# Patient Record
Sex: MALE | URBAN - METROPOLITAN AREA
[De-identification: names, ages, dates, MRNs, and addresses within clinical notes are randomized per-mention and may not be internally consistent; named-entity substitution may affect disease eponyms.]

---

## 2023-01-06 ENCOUNTER — TELEPHONE (OUTPATIENT)
Dept: ENDOCRINOLOGY | Age: 68
End: 2023-01-06

## 2023-01-19 ENCOUNTER — TELEPHONE (OUTPATIENT)
Dept: ENDOCRINOLOGY | Age: 68
End: 2023-01-19

## 2023-01-19 DIAGNOSIS — M89.9 DISEASE OF SKELETAL SYSTEM: Primary | ICD-10-CM

## 2023-01-19 DIAGNOSIS — M85.89 OSTEOPENIA OF MULTIPLE SITES: ICD-10-CM

## 2023-01-20 NOTE — TELEPHONE ENCOUNTER
Patient is scheduled for a NP appointment with Dr. Cele Kaplan on 3/23/23 @ 1:00 pm.        Patient has been advised to bring any vitamins and/or supplements to the appointment.

## 2023-03-23 ENCOUNTER — OFFICE VISIT (OUTPATIENT)
Dept: ENDOCRINOLOGY | Age: 68
End: 2023-03-23
Payer: COMMERCIAL

## 2023-03-23 VITALS — HEIGHT: 65 IN | SYSTOLIC BLOOD PRESSURE: 132 MMHG | DIASTOLIC BLOOD PRESSURE: 78 MMHG

## 2023-03-23 DIAGNOSIS — M89.9 DISEASE OF SKELETAL SYSTEM: Primary | ICD-10-CM

## 2023-03-23 DIAGNOSIS — M85.89 OSTEOPENIA OF MULTIPLE SITES: ICD-10-CM

## 2023-03-23 PROCEDURE — 1123F ACP DISCUSS/DSCN MKR DOCD: CPT | Performed by: INTERNAL MEDICINE

## 2023-03-23 PROCEDURE — 99205 OFFICE O/P NEW HI 60 MIN: CPT | Performed by: INTERNAL MEDICINE

## 2023-03-23 RX ORDER — PHENOL 1.4 %
1 AEROSOL, SPRAY (ML) MUCOUS MEMBRANE DAILY
COMMUNITY

## 2023-03-23 RX ORDER — CYCLOBENZAPRINE HCL 10 MG
10 TABLET ORAL 2 TIMES DAILY PRN
COMMUNITY
Start: 2022-01-01

## 2023-03-23 RX ORDER — ALENDRONATE SODIUM 70 MG/1
70 TABLET ORAL WEEKLY
Qty: 12 TABLET | Refills: 4 | Status: SHIPPED | OUTPATIENT
Start: 2023-03-23

## 2023-03-23 RX ORDER — COVID-19 ANTIGEN TEST
220 KIT MISCELLANEOUS 3 TIMES DAILY
COMMUNITY
Start: 2016-06-01

## 2023-03-23 RX ORDER — IBUPROFEN 200 MG
200 TABLET ORAL EVERY 6 HOURS PRN
COMMUNITY

## 2023-03-23 RX ORDER — TADALAFIL 10 MG/1
10 TABLET ORAL DAILY PRN
COMMUNITY
Start: 2022-06-30

## 2023-03-23 RX ORDER — GABAPENTIN 800 MG/1
800 TABLET ORAL 3 TIMES DAILY
COMMUNITY
Start: 2023-02-19 | End: 2023-05-21

## 2023-03-23 RX ORDER — M-VIT,TX,IRON,MINS/CALC/FOLIC 27MG-0.4MG
1 TABLET ORAL DAILY
COMMUNITY

## 2023-03-23 RX ORDER — ATORVASTATIN CALCIUM 20 MG/1
1 TABLET, FILM COATED ORAL DAILY
COMMUNITY
Start: 2021-06-01

## 2023-03-23 RX ORDER — ACETAMINOPHEN 160 MG
TABLET,DISINTEGRATING ORAL
COMMUNITY

## 2023-03-23 NOTE — PROGRESS NOTES
fusion as planned. Bone density is the hip is in the osteopenia range. Using FRAX, the 10-year fracture risk is low and should remain below the recommended intervention thresholds for the foreseeable future. Pharmacologic therapy to reduce fracture risk is not needed at this time. However, antiresorptive therapy could improve bone strength and improve surgical outcomes. THERAPEUTIC PLANS. Calcium, target 1200 mg daily; we discussed recommended calcium intake and the effects of excessive calcium intake from supplements. I provided a handout with information about how to calculate daily calcium intake. Advised to stop calcium supplements. Vitamin D, current vitamin D intake should be OK. Exercise, Recommended weight-bearing exercise (walking or equivalent) 30-40 minutes per session, 3 or 4 sessions a week. He is limited now by his back problem  Pharmacologic therapy, I recommended alendronate 70 mg weekly and he agreed. Rx sent, dosing instructions reviewed. Return appointment with [facility] in 1 year. Total time on the date the encounter was 60-74 minutes. Michela Muhammad MD, Director, [facility]) Osteoporosis [facility]    CC: Angely Hart MD, fax [phone]  Evi Hoffman MD, fax [phone]  Que Black MD

## 2023-03-23 NOTE — LETTER
[address] [facility]  [facility] [address] [facility], [address] [place] [address],[place] [address]  Phone [phone]  Fax [phone]         2023         Chloe Levi MD, fax [phone]                              Re:  Bree Rangel,  1955    Dear Dr. Gigi Lerma: Thank you for asking me to see Bree Rangel in consultation. As you know, Mr. Mathew Salas is a 79 y.o. with major spine issues, scheduled for lumbar fusion in about 2 weeks. Bone density is spuriously high in the spine, -1.8 in the lowest his site (femoral neck). We reviewed life-style issues (calcium, vitamin D and physical activity). Using FRAX, his 10-year fracture risk is low and should remain below the recommended intervention thresholds for the foreseeable future, so pharmacologic therapy to reduce fracture risk is not needed at this time. However, antiresorptive therapy could improve bone strength and improve surgical outcomes. I recommended alendronate 70 mg weekly and he agreed. Rx sent, dosing instructions reviewed. I see no reason not to proceed with surgery as scheduled. Enclosed is a copy of my consultation note. Please let me know if you have any questions. Sincerely,    [email or web address]     Encl.  Copy of consult note      CC: Tk Melvin MD, fax [phone]  Mario Whittaker MD

## 2023-08-10 ENCOUNTER — OFFICE (OUTPATIENT)
Dept: URBAN - METROPOLITAN AREA CLINIC 18 | Facility: CLINIC | Age: 68
End: 2023-08-10
Payer: COMMERCIAL

## 2023-08-10 VITALS
SYSTOLIC BLOOD PRESSURE: 128 MMHG | WEIGHT: 165 LBS | DIASTOLIC BLOOD PRESSURE: 84 MMHG | HEART RATE: 82 BPM | HEIGHT: 64 IN

## 2023-08-10 DIAGNOSIS — Z80.0 FAMILY HISTORY OF MALIGNANT NEOPLASM OF DIGESTIVE ORGANS: ICD-10-CM

## 2023-08-10 DIAGNOSIS — Z86.010 PERSONAL HISTORY OF COLONIC POLYPS: ICD-10-CM

## 2023-08-10 DIAGNOSIS — Z12.11 ENCOUNTER FOR SCREENING FOR MALIGNANT NEOPLASM OF COLON: ICD-10-CM

## 2023-08-10 PROCEDURE — S0285 CNSLT BEFORE SCREEN COLONOSC: HCPCS | Performed by: INTERNAL MEDICINE

## 2023-09-28 ENCOUNTER — AMBULATORY SURGICAL CENTER (OUTPATIENT)
Dept: URBAN - METROPOLITAN AREA SURGERY 5 | Facility: SURGERY | Age: 68
End: 2023-09-28
Payer: COMMERCIAL

## 2023-09-28 ENCOUNTER — OFFICE (OUTPATIENT)
Dept: URBAN - METROPOLITAN AREA PATHOLOGY 1 | Facility: PATHOLOGY | Age: 68
End: 2023-09-28
Payer: COMMERCIAL

## 2023-09-28 VITALS
RESPIRATION RATE: 17 BRPM | OXYGEN SATURATION: 99 % | RESPIRATION RATE: 20 BRPM | SYSTOLIC BLOOD PRESSURE: 131 MMHG | SYSTOLIC BLOOD PRESSURE: 136 MMHG | DIASTOLIC BLOOD PRESSURE: 91 MMHG | SYSTOLIC BLOOD PRESSURE: 139 MMHG | RESPIRATION RATE: 12 BRPM | RESPIRATION RATE: 14 BRPM | HEART RATE: 99 BPM | HEART RATE: 94 BPM | DIASTOLIC BLOOD PRESSURE: 80 MMHG | DIASTOLIC BLOOD PRESSURE: 81 MMHG | OXYGEN SATURATION: 99 % | SYSTOLIC BLOOD PRESSURE: 131 MMHG | OXYGEN SATURATION: 96 % | HEART RATE: 104 BPM | DIASTOLIC BLOOD PRESSURE: 82 MMHG | DIASTOLIC BLOOD PRESSURE: 85 MMHG | SYSTOLIC BLOOD PRESSURE: 124 MMHG | RESPIRATION RATE: 12 BRPM | HEART RATE: 98 BPM | TEMPERATURE: 97.3 F | RESPIRATION RATE: 18 BRPM | DIASTOLIC BLOOD PRESSURE: 94 MMHG | RESPIRATION RATE: 18 BRPM | HEIGHT: 67 IN | DIASTOLIC BLOOD PRESSURE: 82 MMHG | OXYGEN SATURATION: 96 % | RESPIRATION RATE: 16 BRPM | RESPIRATION RATE: 21 BRPM | HEART RATE: 99 BPM | DIASTOLIC BLOOD PRESSURE: 85 MMHG | SYSTOLIC BLOOD PRESSURE: 126 MMHG | DIASTOLIC BLOOD PRESSURE: 80 MMHG | HEART RATE: 95 BPM | HEART RATE: 94 BPM | DIASTOLIC BLOOD PRESSURE: 84 MMHG | HEART RATE: 95 BPM | SYSTOLIC BLOOD PRESSURE: 145 MMHG | SYSTOLIC BLOOD PRESSURE: 138 MMHG | DIASTOLIC BLOOD PRESSURE: 100 MMHG | HEART RATE: 97 BPM | OXYGEN SATURATION: 98 % | DIASTOLIC BLOOD PRESSURE: 90 MMHG | OXYGEN SATURATION: 100 % | SYSTOLIC BLOOD PRESSURE: 130 MMHG | HEART RATE: 96 BPM | SYSTOLIC BLOOD PRESSURE: 124 MMHG | SYSTOLIC BLOOD PRESSURE: 136 MMHG | DIASTOLIC BLOOD PRESSURE: 100 MMHG | DIASTOLIC BLOOD PRESSURE: 90 MMHG | HEART RATE: 98 BPM | SYSTOLIC BLOOD PRESSURE: 130 MMHG | WEIGHT: 170 LBS | WEIGHT: 170 LBS | RESPIRATION RATE: 8 BRPM | SYSTOLIC BLOOD PRESSURE: 161 MMHG | RESPIRATION RATE: 14 BRPM | SYSTOLIC BLOOD PRESSURE: 156 MMHG | RESPIRATION RATE: 20 BRPM | SYSTOLIC BLOOD PRESSURE: 145 MMHG | TEMPERATURE: 97.3 F | HEART RATE: 87 BPM | SYSTOLIC BLOOD PRESSURE: 123 MMHG | SYSTOLIC BLOOD PRESSURE: 156 MMHG | HEART RATE: 97 BPM | RESPIRATION RATE: 8 BRPM | SYSTOLIC BLOOD PRESSURE: 161 MMHG | SYSTOLIC BLOOD PRESSURE: 139 MMHG | DIASTOLIC BLOOD PRESSURE: 84 MMHG | SYSTOLIC BLOOD PRESSURE: 126 MMHG | RESPIRATION RATE: 17 BRPM | OXYGEN SATURATION: 98 % | SYSTOLIC BLOOD PRESSURE: 138 MMHG | RESPIRATION RATE: 21 BRPM | DIASTOLIC BLOOD PRESSURE: 81 MMHG | OXYGEN SATURATION: 100 % | SYSTOLIC BLOOD PRESSURE: 123 MMHG | HEIGHT: 67 IN | HEART RATE: 87 BPM | HEART RATE: 104 BPM | RESPIRATION RATE: 16 BRPM | DIASTOLIC BLOOD PRESSURE: 91 MMHG | HEART RATE: 96 BPM | DIASTOLIC BLOOD PRESSURE: 94 MMHG

## 2023-09-28 DIAGNOSIS — K63.5 POLYP OF COLON: ICD-10-CM

## 2023-09-28 DIAGNOSIS — K57.30 DIVERTICULOSIS OF LARGE INTESTINE WITHOUT PERFORATION OR ABS: ICD-10-CM

## 2023-09-28 DIAGNOSIS — Z86.010 PERSONAL HISTORY OF COLONIC POLYPS: ICD-10-CM

## 2023-09-28 DIAGNOSIS — Z80.0 FAMILY HISTORY OF MALIGNANT NEOPLASM OF DIGESTIVE ORGANS: ICD-10-CM

## 2023-09-28 DIAGNOSIS — Z09 ENCOUNTER FOR FOLLOW-UP EXAMINATION AFTER COMPLETED TREATMEN: ICD-10-CM

## 2023-09-28 PROCEDURE — 45385 COLONOSCOPY W/LESION REMOVAL: CPT | Mod: 33 | Performed by: INTERNAL MEDICINE

## 2023-09-28 PROCEDURE — 88305 TISSUE EXAM BY PATHOLOGIST: CPT | Performed by: PATHOLOGY

## 2023-10-03 LAB
PDF: PDF REPORT: (no result)
PDF: PDF REPORT: (no result)

## 2024-04-14 PROBLEM — M85.80 OSTEOPENIA WITH HIGH RISK OF FRACTURE: Status: ACTIVE | Noted: 2023-01-19

## 2024-05-14 ENCOUNTER — OFFICE VISIT (OUTPATIENT)
Dept: ENDOCRINOLOGY | Age: 69
End: 2024-05-14
Payer: MEDICARE

## 2024-05-14 ENCOUNTER — HOSPITAL ENCOUNTER (OUTPATIENT)
Dept: GENERAL RADIOLOGY | Age: 69
Discharge: HOME | End: 2024-05-14
Payer: MEDICARE

## 2024-05-14 DIAGNOSIS — M89.9 DISEASE OF SKELETAL SYSTEM: ICD-10-CM

## 2024-05-14 DIAGNOSIS — M85.80 OSTEOPENIA WITH HIGH RISK OF FRACTURE: ICD-10-CM

## 2024-05-14 DIAGNOSIS — M89.9 DISEASE OF SKELETAL SYSTEM: Primary | ICD-10-CM

## 2024-05-14 PROCEDURE — 77080 DXA BONE DENSITY AXIAL: CPT | Performed by: INTERNAL MEDICINE

## 2024-05-14 PROCEDURE — 1123F ACP DISCUSS/DSCN MKR DOCD: CPT | Performed by: INTERNAL MEDICINE

## 2024-05-14 PROCEDURE — 1036F TOBACCO NON-USER: CPT | Performed by: INTERNAL MEDICINE

## 2024-05-14 PROCEDURE — G2211 COMPLEX E/M VISIT ADD ON: HCPCS | Performed by: INTERNAL MEDICINE

## 2024-05-14 PROCEDURE — G8421 BMI NOT CALCULATED: HCPCS | Performed by: INTERNAL MEDICINE

## 2024-05-14 PROCEDURE — G8427 DOCREV CUR MEDS BY ELIG CLIN: HCPCS | Performed by: INTERNAL MEDICINE

## 2024-05-14 PROCEDURE — 99214 OFFICE O/P EST MOD 30 MIN: CPT | Performed by: INTERNAL MEDICINE

## 2024-05-14 PROCEDURE — 77080 DXA BONE DENSITY AXIAL: CPT

## 2024-05-14 PROCEDURE — 3017F COLORECTAL CA SCREEN DOC REV: CPT | Performed by: INTERNAL MEDICINE

## 2024-05-14 RX ORDER — MELOXICAM 15 MG/1
TABLET ORAL
COMMUNITY

## 2024-05-14 RX ORDER — ALENDRONATE SODIUM 70 MG/1
70 TABLET ORAL WEEKLY
Qty: 12 TABLET | Refills: 4 | Status: SHIPPED | OUTPATIENT
Start: 2024-05-14

## 2024-05-14 NOTE — PROGRESS NOTES
[facility]  [address] [place] [address] [address] [place] [address]  Phone [phone]  Fax [phone]    NAME:  IVIS HERR  :  1955  CONSULT DATE:    2023  MOST RECENT VISIT:  2023  TODAY'S DATE:  2024    Labs @ [place] 2023    PROBLEMS.  Low bone density by DXA 10/2022, lowest T-score -1.8 in the left femoral neck    Family history of osteoporosis, none  Prostate cancer, surgery 2014  Balance problems, frequent falls  Spinal stenosis, scoliosis - T7-S1 fusion 2023, redo 2023, broken maría and screw 10/2023    CURRENT MANAGEMENT FOR BONE HEALTH/OSTEOPOROSIS.  Calcium, 300 mg from low calcium foods, 300 mg milk, 300 mg yogurt, 300 mg cheese   diet MVI Ca+D other    Calcium 1200 210   mg/d   Vitamin D  1000  2000 IU/d   Exercise, limited by back problem.  Pharmacologic therapy: alendronate 70 mg weekly started 2023    PREVIOUS BONE-ACTIVE MEDICATIONS. none    OTHER CURRENT MEDICATIONS (SELECTED): tadalafil, gabapentin, Flexeril, atorvastatin  OTC MEDICATIONS (SELECTED): none    CHIEF COMPLAINT.  Here for f/u of low bone density, possible spine surgery, monitoring treatment.    INTERVAL HISTORY: See problem list for chronic/inactive conditions.  He has been taking alendronate correctly and without side effects. No falls, near-falls or fractures.  In a brace, still recovering from spine surgery.     FOR FULL DETAILS OF FAMILY HISTORY, PAST MEDICAL AND SURGICAL HISTORY, SOCIAL HISTORY, AND REVIEW OF SYSTEMS, SEE PATIENT QUESTIONNAIRE OF TODAY'S DATE.    PHYSICAL EXAMINATION.   GENERAL.  Well-nourished, well-developed, normally proportioned adult.   MENTAL STATUS. Pleasant mood.  Oriented to time, place, and person.  ORAL. Teeth appear to be in good condition.  MUSCULOSKELETAL.  Flattened thoracic spine. Marked lumbar scoliosis.  No spine tenderness to palpation or percussion.   No finger spaces between ribs and pelvis.  Gait

## 2024-06-13 DIAGNOSIS — M85.80 OSTEOPENIA WITH HIGH RISK OF FRACTURE: Primary | ICD-10-CM

## 2024-06-13 RX ORDER — ALENDRONATE SODIUM 70 MG/1
70 TABLET ORAL WEEKLY
Qty: 12 TABLET | Refills: 4 | Status: SHIPPED | OUTPATIENT
Start: 2024-06-13

## 2024-06-24 DIAGNOSIS — M85.80 OSTEOPENIA WITH HIGH RISK OF FRACTURE: ICD-10-CM

## 2024-06-24 NOTE — TELEPHONE ENCOUNTER
LVM to ask if patient wants refills for alendronate faxed to [facility] or [facility] on [facility]?

## 2024-06-25 RX ORDER — ALENDRONATE SODIUM 70 MG/1
70 TABLET ORAL WEEKLY
Qty: 12 TABLET | Refills: 4 | OUTPATIENT
Start: 2024-06-25

## 2024-06-25 NOTE — TELEPHONE ENCOUNTER
We sent Rx's to [facility] in May, 12 tabs, 4 refills. No reason they need another one now.

## 2024-06-26 ENCOUNTER — PATIENT MESSAGE (OUTPATIENT)
Dept: ENDOCRINOLOGY | Age: 69
End: 2024-06-26

## 2024-06-27 NOTE — TELEPHONE ENCOUNTER
Spoke with [facility] and gave verbal sig for alendronate 70 mg, take 1 tablet weekly. [facility] will be shipping medication to patient.

## 2025-04-01 ENCOUNTER — PATIENT MESSAGE (OUTPATIENT)
Dept: ENDOCRINOLOGY | Age: 70
End: 2025-04-01

## 2025-04-01 DIAGNOSIS — M85.80 OSTEOPENIA WITH HIGH RISK OF FRACTURE: ICD-10-CM

## 2025-04-01 RX ORDER — ALENDRONATE SODIUM 70 MG/1
70 TABLET ORAL WEEKLY
Qty: 12 TABLET | Refills: 0 | Status: SHIPPED | OUTPATIENT
Start: 2025-04-01

## 2025-04-20 PROBLEM — Z51.81 MEDICATION MONITORING ENCOUNTER: Status: ACTIVE | Noted: 2025-04-20

## 2025-05-13 ENCOUNTER — TELEPHONE (OUTPATIENT)
Dept: ENDOCRINOLOGY | Age: 70
End: 2025-05-13

## 2025-05-13 NOTE — TELEPHONE ENCOUNTER
Pt called and canceled his appt 5/20 @ 240pm @ [facility]                                                               @ 3pm appt w/ Dr Muhammad      Pt had surgery and does not have transportation to appt    Rescheduled to 6/16/25  @ 120pm @ [facility]                                           @ 140pm appt w/ Dr Muhammad

## 2025-06-16 ENCOUNTER — OFFICE VISIT (OUTPATIENT)
Dept: ENDOCRINOLOGY | Age: 70
End: 2025-06-16
Payer: MEDICARE

## 2025-06-16 ENCOUNTER — HOSPITAL ENCOUNTER (OUTPATIENT)
Dept: GENERAL RADIOLOGY | Age: 70
Discharge: HOME | End: 2025-06-16
Payer: MEDICARE

## 2025-06-16 VITALS — WEIGHT: 179.2 LBS | BODY MASS INDEX: 29.78 KG/M2

## 2025-06-16 DIAGNOSIS — M89.9 DISEASE OF SKELETAL SYSTEM: ICD-10-CM

## 2025-06-16 DIAGNOSIS — M85.80 OSTEOPENIA WITH HIGH RISK OF FRACTURE: Primary | ICD-10-CM

## 2025-06-16 DIAGNOSIS — Z51.81 MEDICATION MONITORING ENCOUNTER: ICD-10-CM

## 2025-06-16 DIAGNOSIS — M85.80 OSTEOPENIA WITH HIGH RISK OF FRACTURE: ICD-10-CM

## 2025-06-16 PROCEDURE — 77080 DXA BONE DENSITY AXIAL: CPT | Performed by: INTERNAL MEDICINE

## 2025-06-16 PROCEDURE — G2211 COMPLEX E/M VISIT ADD ON: HCPCS | Performed by: INTERNAL MEDICINE

## 2025-06-16 PROCEDURE — 1123F ACP DISCUSS/DSCN MKR DOCD: CPT | Performed by: INTERNAL MEDICINE

## 2025-06-16 PROCEDURE — 1159F MED LIST DOCD IN RCRD: CPT | Performed by: INTERNAL MEDICINE

## 2025-06-16 PROCEDURE — 77080 DXA BONE DENSITY AXIAL: CPT

## 2025-06-16 PROCEDURE — 99214 OFFICE O/P EST MOD 30 MIN: CPT | Performed by: INTERNAL MEDICINE

## 2025-06-16 RX ORDER — COVID-19 ANTIGEN TEST
KIT MISCELLANEOUS
COMMUNITY
Start: 2025-06-12

## 2025-06-16 RX ORDER — ALENDRONATE SODIUM 70 MG/1
70 TABLET ORAL WEEKLY
Qty: 12 TABLET | Refills: 4 | Status: SHIPPED | OUTPATIENT
Start: 2025-06-16

## 2025-06-16 RX ORDER — OXYCODONE HYDROCHLORIDE 5 MG/1
TABLET ORAL
COMMUNITY

## 2025-06-16 NOTE — PROGRESS NOTES
[facility]  [address] [place] [address] [address] [place] [address]  Phone [phone]  Fax [phone]    NAME:  IVIS HERR  :  1955  CONSULT DATE:    2023  MOST RECENT VISIT:  2024  TODAY'S DATE:  2025    Labs @ [place] 2023    PROBLEMS.  Low bone density by DXA 10/2022, lowest T-score -1.8 in the left femoral neck    Family history of osteoporosis, none  Prostate cancer, surgery 2014  Balance problems, frequent falls  Spinal stenosis, scoliosis - T7-S1 fusion 2023, redo 2023, broken maría and screw 10/2023    Redo 2025    CURRENT MANAGEMENT FOR BONE HEALTH/OSTEOPOROSIS.  Calcium, 300 mg from low calcium foods, 300 mg milk, 300 mg yogurt, 300 mg cheese   diet MVI Ca+D other    Calcium 1200 210   mg/d   Vitamin D  1000  2000 IU/d   Exercise, limited by back problem.  Pharmacologic therapy: alendronate 70 mg weekly started 2023    PREVIOUS BONE-ACTIVE MEDICATIONS. none    OTHER CURRENT MEDICATIONS (SELECTED): tadalafil, gabapentin, Flexeril, atorvastatin  OTC MEDICATIONS (SELECTED): none    CHIEF COMPLAINT.  Here for f/u of low bone density, possible spine surgery, monitoring treatment.    INTERVAL HISTORY: See problem list for chronic/inactive conditions.  He has been taking alendronate correctly and without side effects. No falls, near-falls or fractures.  Improved after more spine surgery 2025.    FOR FULL DETAILS OF FAMILY HISTORY, PAST MEDICAL AND SURGICAL HISTORY, SOCIAL HISTORY, SEE PATIENT QUESTIONNAIRE OF TODAY'S DATE.    PHYSICAL EXAMINATION.   GENERAL.  Well-nourished, well-developed, normally proportioned adult.   MENTAL STATUS. Pleasant mood.  Oriented to time, place, and person.  ORAL. Teeth appear to be in good condition.  MUSCULOSKELETAL.  Flattened thoracic spine. Marked lumbar scoliosis.  No spine tenderness to palpation or percussion.   No finger spaces between ribs and pelvis.  Gait halting; using a